# Patient Record
Sex: MALE
[De-identification: names, ages, dates, MRNs, and addresses within clinical notes are randomized per-mention and may not be internally consistent; named-entity substitution may affect disease eponyms.]

---

## 2022-02-10 PROBLEM — Z00.00 ENCOUNTER FOR PREVENTIVE HEALTH EXAMINATION: Status: ACTIVE | Noted: 2022-02-10

## 2022-06-07 ENCOUNTER — APPOINTMENT (OUTPATIENT)
Dept: INTERNAL MEDICINE | Facility: CLINIC | Age: 57
End: 2022-06-07

## 2022-07-13 ENCOUNTER — APPOINTMENT (OUTPATIENT)
Dept: INTERNAL MEDICINE | Facility: CLINIC | Age: 57
End: 2022-07-13

## 2024-10-19 ENCOUNTER — EMERGENCY (EMERGENCY)
Facility: HOSPITAL | Age: 59
LOS: 1 days | Discharge: HOME | End: 2024-10-19
Attending: EMERGENCY MEDICINE | Admitting: EMERGENCY MEDICINE
Payer: COMMERCIAL

## 2024-10-19 VITALS
HEART RATE: 91 BPM | TEMPERATURE: 99 F | WEIGHT: 179.9 LBS | DIASTOLIC BLOOD PRESSURE: 97 MMHG | OXYGEN SATURATION: 100 % | RESPIRATION RATE: 17 BRPM | SYSTOLIC BLOOD PRESSURE: 152 MMHG

## 2024-10-19 PROCEDURE — 10060 I&D ABSCESS SIMPLE/SINGLE: CPT

## 2024-10-19 PROCEDURE — 99284 EMERGENCY DEPT VISIT MOD MDM: CPT | Mod: 25

## 2024-10-19 RX ORDER — LIDOCAINE HCL/EPINEPHRINE 2 %-1:100K
20 VIAL (ML) INJECTION ONCE
Refills: 0 | Status: COMPLETED | OUTPATIENT
Start: 2024-10-19 | End: 2024-10-19

## 2024-10-19 RX ORDER — CEPHALEXIN 500 MG
1 CAPSULE ORAL
Qty: 20 | Refills: 0
Start: 2024-10-19 | End: 2024-10-23

## 2024-10-19 RX ADMIN — Medication 20 MILLILITER(S): at 18:18

## 2024-10-19 NOTE — ED ADULT TRIAGE NOTE - CHIEF COMPLAINT QUOTE
pt c/o x4 days L buttock swelling and pain. Endorses fevers and chills at home, has been taking tylenol. Denies chest pain, SOB, nausea, vomiting, diarrhea  denies medical history

## 2024-10-19 NOTE — ED PROVIDER NOTE - PHYSICAL EXAMINATION
Const: Awake, alert, no acute distress.  Well appearing.  Moving comfortably on stretcher.  Resp: Speaking in full sentences. No evidence of respiratory distress. Normal chest excursion.  Skin: Normal coloration.  No rashes, abrasions or lacerations. 3 cm swelling over the lateral left buttock, indurated.   Neuro: Awake, alert & oriented x 3.  Moves all extremities spontaneously.  No focal deficits.

## 2024-10-19 NOTE — ED ADULT NURSE NOTE - CHIEF COMPLAINT QUOTE
pt c/o x4 days L buttock swelling and pain. Endorses fevers and chills at home, has been taking tylenol. Denies chest pain, SOB, nausea, vomiting, diarrhea  denies medical history
no

## 2024-10-19 NOTE — ED PROVIDER NOTE - NSFOLLOWUPINSTRUCTIONS_ED_ALL_ED_FT
It was a pleasure caring for you today!    You were seen in the ER today for buttock swelling. Please take Keflex as directed.     Please follow up with your primary care doctor within 1 - 3 days. Call and let them know you were seen in the ER today.   Bring the results of your blood work and imaging with you to your appointment, if applicable.    For pain, please take acetaminophen 650 mg every 6 hours for pain. Additionally, you can also take ibuprofen 400 mg every 6-8 hours for pain.    Return to the ER for any worsening symptoms or concerns, including chest pain, shortness of breath, lightheadedness, weakness, or any other concerns.

## 2024-10-19 NOTE — ED PROVIDER NOTE - PATIENT PORTAL LINK FT
You can access the FollowMyHealth Patient Portal offered by [facility] by registering at the following website: [email or web address]. By joining [employer]’s FollowMyHealth portal, you will also be able to view your health information using other applications (apps) compatible with our system.

## 2024-10-19 NOTE — ED ADULT NURSE NOTE - OBJECTIVE STATEMENT
Received patient in Intake 3 c/o abscess on left buttock, fever, chills. Patient denies SOB, chest pain. Patient is A&OX4, airway patent, breathing unlabored and even. Side rails up and safety maintained. MD at bedside for evaluation. Call bells within reach.

## 2024-10-19 NOTE — ED PROCEDURE NOTE - CPROC ED FINDINGS1
Your current Orthopaedic problem we are working together to treat is:  Bilateral hip pain.        IMAGING:  You have been recommended to undergo an MRI of your hip to help us better understand and treat your symptoms. Please stop at reception to schedule the imaging appointment or call [facility] for Imaging - [phone] ([facility] sites & [facility] sites). Make a follow up appointment to discuss your results.    It is recommended you schedule a follow-up appointment with oDnta Multani MD after MRI is completed.      Office hours are 8:00 am to 5:00 pm Monday through Friday. If it is urgent that you speak with someone outside of these hours, our [facility] Call Center will be able to assist you. You can reach the office by calling the [facility] at [phone] or [facility] at [phone].    We do highly recommend [employer], if you do not already have this. You can request access via the internet or by simply talking with a  at any of the clinics.   [email or web address].      Thank you for choosing [facility] as your Orthopedic provider!      
purulent drainage

## 2024-10-19 NOTE — ED PROVIDER NOTE - CLINICAL SUMMARY MEDICAL DECISION MAKING FREE TEXT BOX
59-year-old male with no significant past medical history presents to the ED with left buttocks swelling.  Patient endorses having a bump for the past 2 months however over the past couple days noticed redness.  Patient also endorses subjective fevers.  Patient afebrile and HD stable in the department.  3 cm swelling over the lateral left buttock, indurated.  DDx includes abscess versus sebaceous cyst.

## 2024-10-19 NOTE — ED PROVIDER NOTE - ATTENDING CONTRIBUTION TO CARE
Otherwise healthy male presents emergency department for evaluation of painful area of swelling over left gluteal/hip area.  Has been present for few months and has been touching it consistently but now became more painful.  On exam there is no overlying erythema, ttp. I&D performed at bedside with release of sebaceous material, small amount of blood and small amount of purulence.  Please see procedure note for further details. AFeb.  Will follow-up with primary care doctor.

## 2024-10-20 LAB
GRAM STN FLD: ABNORMAL
SPECIMEN SOURCE: SIGNIFICANT CHANGE UP

## 2024-10-20 NOTE — ED POST DISCHARGE NOTE - RESULT SUMMARY
Lab called about patient's gram stain of abscess. Patient discharged home with a prescription for on Keflex. message left with Call Back  P.A. number and hours for return call back.

## 2024-10-23 LAB
CULTURE RESULTS: ABNORMAL
SPECIMEN SOURCE: SIGNIFICANT CHANGE UP
